# Patient Record
Sex: MALE | URBAN - METROPOLITAN AREA
[De-identification: names, ages, dates, MRNs, and addresses within clinical notes are randomized per-mention and may not be internally consistent; named-entity substitution may affect disease eponyms.]

---

## 2024-04-29 ENCOUNTER — TELEPHONE (OUTPATIENT)
Dept: PRIMARY CARE CLINIC | Age: 64
End: 2024-04-29

## 2024-04-29 NOTE — TELEPHONE ENCOUNTER
BEHZAD for PT to call the office regarding below and that Dr. Ramirez does not have any availabilities for a new patient until the end of June.

## 2024-04-29 NOTE — TELEPHONE ENCOUNTER
----- Message from Pamella Negro sent at 4/29/2024  2:31 PM EDT -----  Regarding: [facility] Appointment Request  [facility] Appointment Request    Patient needs appointment for [facility] Appointment Type: New Patient.    Reason for Appointment Request: Requested Provider maida / Afsaneh Arvinkristin Ramirez wants to be the PCP and also wants to have a blood work done / wants to have an appointment before May 13   --------------------------------------------------------------------------------------------------------------------------    Relationship to Patient: Self     Call Back Information: OK to leave message on voicemail  Preferred Call Back Number: Phone [phone]

## 2024-07-11 ENCOUNTER — TELEPHONE (OUTPATIENT)
Dept: PRIMARY CARE CLINIC | Age: 64
End: 2024-07-11

## 2024-07-11 NOTE — TELEPHONE ENCOUNTER
----- Message from Sharona May sent at 7/11/2024 12:24 PM EDT -----  Regarding: [facility] Appointment Request  [facility] Appointment Request    Patient needs appointment for [facility] Appointment Type: New to Provider.    Patient Requested Dates(s): As soon as possible   Patient Requested Time: morning   Provider Name: Afsaneh Ramirez DO    Reason for Appointment Request: New Patient - No appointments available during search, patient want to establish a new patient appointment under doctor Afsaneh Ramirez DO, for purposes of medication prescription  --------------------------------------------------------------------------------------------------------------------------    Relationship to Patient: Self     Call Back Information: OK to leave message on voicemail  Preferred Call Back Number: [phone]

## 2024-07-11 NOTE — TELEPHONE ENCOUNTER
Spoke to pt he was informed to call [phone] pt stated he will wait until we have room for new pt